# Patient Record
Sex: MALE | Race: OTHER | HISPANIC OR LATINO
[De-identification: names, ages, dates, MRNs, and addresses within clinical notes are randomized per-mention and may not be internally consistent; named-entity substitution may affect disease eponyms.]

---

## 2017-03-13 ENCOUNTER — APPOINTMENT (OUTPATIENT)
Dept: INTERNAL MEDICINE | Facility: CLINIC | Age: 37
End: 2017-03-13

## 2018-02-14 ENCOUNTER — EMERGENCY (EMERGENCY)
Facility: HOSPITAL | Age: 38
LOS: 1 days | Discharge: HOME | End: 2018-02-14
Admitting: EMERGENCY MEDICINE
Payer: COMMERCIAL

## 2018-02-14 VITALS
OXYGEN SATURATION: 100 % | HEART RATE: 71 BPM | RESPIRATION RATE: 16 BRPM | DIASTOLIC BLOOD PRESSURE: 90 MMHG | TEMPERATURE: 98 F | SYSTOLIC BLOOD PRESSURE: 152 MMHG

## 2018-02-14 PROCEDURE — 99282 EMERGENCY DEPT VISIT SF MDM: CPT | Mod: 25

## 2018-02-14 RX ORDER — ACETAMINOPHEN 500 MG
650 TABLET ORAL ONCE
Qty: 0 | Refills: 0 | Status: COMPLETED | OUTPATIENT
Start: 2018-02-14 | End: 2018-02-14

## 2018-02-14 RX ADMIN — Medication 650 MILLIGRAM(S): at 07:20

## 2018-02-14 NOTE — PROGRESS NOTE ADULT - SUBJECTIVE AND OBJECTIVE BOX
Patient is a 37y old male who presents with a chief complaint of right ear pain. Dental consulted to evaluate for possible odontogenic origin.    HPI: "37yM w/ pmhx recent dental abscess drained last week presenting with right submandibular pain. Pt states he originally felt right ear pain beginning in January, he saw his ENT last week and was told he had inflammation of the right ear, was given a course of amoxiciillin. 10 days ago pt had a dental filling places in his right upper molar, and had a dental abscess drained to the left upper molar 5 days ago. Pt states his dentist told him the pain was not from his teeth. Pt states pain starts below the right ear and radiates to the submandibular region. Denies fever/chills, headache, dizziness, change to hearing or vision, numbness/tingling, redness to area, swelling to area, abd pain, n/v/d, cp, sob or any other concerns."    Patient reports that he has had right ear-region pain since mid-January, and states that he wants a second opinion on where this pain might be coming from. Patient reports that he feels that he grinds his teeth.      PAST MEDICAL & SURGICAL HISTORY:  No pertinent past medical history  No pertinent past medical history  No significant past surgical history  No significant past surgical history    MEDICATIONS  (STANDING): None    Allergies: No Known Allergies    SOCIAL HISTORY: Patient presents to ED alone.    Last Dental Visit: 1 week ago.    Vital Signs Last 24 Hrs  T(C): 36.4 (14 Feb 2018 06:23), Max: 36.4 (14 Feb 2018 06:23)  T(F): 97.6 (14 Feb 2018 06:23), Max: 97.6 (14 Feb 2018 06:23)  HR: 71 (14 Feb 2018 06:23) (71 - 71)  BP: 152/90 (14 Feb 2018 06:23) (152/90 - 152/90)  BP(mean): --  RR: 16 (14 Feb 2018 06:23) (16 - 16)  SpO2: 100% (14 Feb 2018 06:23) (100% - 100%)    EOE:  TMJ (  - ) clicks                    ( -   ) pops                    (  -  ) crepitus             Mandible FROM             Facial bones and MOM grossly intact             (  - ) trismus             ( -  ) LAD             (  - ) swelling             ( -  ) asymmetry             (  + ) palpation: Slight pain on palpation of muscle (possibly plastysma, sternocleidfomastoid) immediately inferior to right angle of mandible. Differential diagnosis includes muscle soreness/guarding due to bruxism.             (  - ) dysphagia    IOE:   Permanent dentition, grossly intact.            hard/soft palate:  ( -  ) palatal torus, No pathology noted           tongue/FOM No pathology noted           labial/buccal mucosa No pathology noted           (  - ) percussion           (  - ) palpation           ( -  ) swelling   No gross caries detected on any teeth in UR or LL. Gingiva WNL. No vestibular fullness or erythema. Existing fillings grossly intact.     DENTAL RADIOGRAPHS: Panoramic radiograph taken. No PARs noted on upper right or lower right. Teeth in upper right and lower right grossly intact.    RADIOLOGY & ADDITIONAL STUDIES: None    ASSESSMENT: Patient presents with no signs or symptoms of acute infection at this time.     PROCEDURE:  Verbal  consent given. EOE, IOE performed, panoramic radiograph taken.    RECOMMENDATIONS:  1) OTC pain medication regimen as needed: acetaminophen or ibuprofen  2) Dental F/U with outpatient dentist for comprehensive dental care.   3) If any acute oral changes occur, including oral swelling, oral bleeding, difficulty swallowing/breathing, fever occur, return to ED.    Sherry Hgoue DDS. Pager #[phone].

## 2018-02-14 NOTE — ED PROVIDER NOTE - PROGRESS NOTE DETAILS
TAMARA Delgado: Spoke with dental who will see patient in the ED TAMARA Delgado: Spoke with dental, no sign of infection or need for antibiotics, likely muscle pain from gaurding due to pain from recent procedures TAMARA Delgado: Spoke with dental, no sign of infection or need for antibiotics, likely muscle pain from guarding due to pain from recent procedures TAMARA Delgado: Pt feeling pain relief with tylenol will d/c home with dental follow up. Pt understands to return to the emergency department with any worsening symptoms, increased pain, neck swelling, fever, throat pain or any other concerns.

## 2018-02-14 NOTE — ED PROVIDER NOTE - PHYSICAL EXAMINATION
Ear: tympanic membranes clear b/l, normal landmarks, canals are clear without erythema, non-tender pinna and auricle b/l Ear: tympanic membranes clear b/l, normal landmarks, canals are clear without erythema, non-tender pinna and auricle b/l  Neck: no swelling noted, mild tenderness to right submandibular region, no lymphadenopathy

## 2018-02-14 NOTE — ED PROVIDER NOTE - CARE PLAN
Principal Discharge DX:	Jaw pain  Assessment and plan of treatment:	Follow up with your dentist within one week  Follow up with your primary care provider within 48 hours  Take Tylenol 650mg every 6 hours as needed for pain  Return to the emergency department with any worsening or concerning symptoms, increased pain or swelling, sore throat, difficulty swallowing, fever or any other concerns.

## 2018-02-14 NOTE — ED PROVIDER NOTE - OBJECTIVE STATEMENT
37yM w/ pmhx recent dental abscess drained last week presenting with right submandibular pain. Pt states he originally felt right ear pain beginning in January, he saw his ENT last week and was told he had inflammation of the right ear, was given a course of amoxiciillin. 10 days ago pt had a dental filling places in his right upper molar, and had a dental abscess drained to the left upper molar 5 days ago. Pt states his dentist told him the pain was not from his teeth. Pt states pain starts below the right ear and radiates to the submandibular region. Denies fever/chills, headache, dizziness, change to hearing or vision, numbness/tingling, redness to area, swelling to area, abd pain, n/v/d, cp, sob or any other concerns. 37yM w/ pmhx recent dental abscess drained last week presenting with right submandibular pain. Pt states he originally felt right ear pain beginning in January, he saw his ENT last week and was told he had inflammation of the right ear, was given a course of amoxicillin. 10 days ago pt had a dental filling places in his right upper molar, and had a dental abscess drained to the left upper molar 5 days ago. Pt states his dentist told him the pain was not from his teeth. Pt states pain starts below the right ear and radiates to the submandibular region. Denies fever/chills, headache, dizziness, change to hearing or vision, numbness/tingling, redness to area, swelling to area, abd pain, n/v/d, cp, sob or any other concerns.

## 2018-02-14 NOTE — ED PROVIDER NOTE - MEDICAL DECISION MAKING DETAILS
37yM w/pmhx recent tx for right ear inflammation w/ amoxicillin, denal abscess drained 5 days ago by oral surgeon and filling placed by dentist 10 days ago presenting with right submandibular pain. Mild TTP along right submandibular region, no swelling noted, no difficulty swallowing, no sore throat, no fever/chills. Will consult dental given recent dental work to evaluate for possible dental source, pt admits to grinding his teeth as of late. Will give tylenol and reassess.

## 2018-02-14 NOTE — ED PROVIDER NOTE - CHPI ED SYMPTOMS NEG
no loss of consciousness/no nausea/no change in level of consciousness/no chills/no blurred vision/no syncope/no numbness/no weakness/no vomiting/no fever

## 2018-02-14 NOTE — ED PROVIDER NOTE - PLAN OF CARE
Follow up with your dentist within one week  Follow up with your primary care provider within 48 hours  Take Tylenol 650mg every 6 hours as needed for pain  Return to the emergency department with any worsening or concerning symptoms, increased pain or swelling, sore throat, difficulty swallowing, fever or any other concerns.

## 2018-02-14 NOTE — ED ADULT TRIAGE NOTE - CHIEF COMPLAINT QUOTE
Pt c/o right ear pain that radiates to right jaw area. Pt reports that he was dx with a right ear infection last week and completed his prescribed Amoxicillin med.

## 2018-12-30 ENCOUNTER — EMERGENCY (EMERGENCY)
Facility: HOSPITAL | Age: 38
LOS: 1 days | End: 2018-12-30
Attending: EMERGENCY MEDICINE
Payer: COMMERCIAL

## 2018-12-30 VITALS
HEIGHT: 68 IN | OXYGEN SATURATION: 98 % | RESPIRATION RATE: 18 BRPM | SYSTOLIC BLOOD PRESSURE: 140 MMHG | HEART RATE: 82 BPM | TEMPERATURE: 98 F | DIASTOLIC BLOOD PRESSURE: 78 MMHG | WEIGHT: 162.92 LBS

## 2018-12-30 VITALS
DIASTOLIC BLOOD PRESSURE: 81 MMHG | HEART RATE: 62 BPM | OXYGEN SATURATION: 98 % | RESPIRATION RATE: 18 BRPM | SYSTOLIC BLOOD PRESSURE: 132 MMHG

## 2018-12-30 PROCEDURE — 73140 X-RAY EXAM OF FINGER(S): CPT

## 2018-12-30 PROCEDURE — 99053 MED SERV 10PM-8AM 24 HR FAC: CPT

## 2018-12-30 PROCEDURE — 73140 X-RAY EXAM OF FINGER(S): CPT | Mod: 26,LT

## 2018-12-30 PROCEDURE — 99283 EMERGENCY DEPT VISIT LOW MDM: CPT | Mod: 25

## 2018-12-30 PROCEDURE — 99284 EMERGENCY DEPT VISIT MOD MDM: CPT

## 2018-12-30 PROCEDURE — 73564 X-RAY EXAM KNEE 4 OR MORE: CPT | Mod: 26,LT

## 2018-12-30 PROCEDURE — 73564 X-RAY EXAM KNEE 4 OR MORE: CPT

## 2018-12-30 RX ORDER — IBUPROFEN 200 MG
600 TABLET ORAL ONCE
Qty: 0 | Refills: 0 | Status: COMPLETED | OUTPATIENT
Start: 2018-12-30 | End: 2018-12-30

## 2018-12-30 RX ADMIN — Medication 600 MILLIGRAM(S): at 00:53

## 2018-12-30 NOTE — ED PROVIDER NOTE - NSFOLLOWUPINSTRUCTIONS_ED_ALL_ED_FT
Follow up with orthopedics. See the provided number or call [phone] to establish follow up  Take ibuprofen 600mg every 6 hours and/or acetaminophen 650mg every 4-6 hours as needed for pain  Ice the affected areas for 10-15 minutes at a time several times daily and keep the injured extremities elevated while at rest  Return to the ER for any new or worsening pain.
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
## 2018-12-30 NOTE — ED ADULT NURSE NOTE - NSIMPLEMENTINTERV_GEN_ALL_ED
Implemented All Universal Safety Interventions:  [place] to call system. Call bell, personal items and telephone within reach. Instruct patient to call for assistance. Room bathroom lighting operational. Non-slip footwear when patient is off stretcher. Physically safe environment: no spills, clutter or unnecessary equipment. Stretcher in lowest position, wheels locked, appropriate side rails in place.
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
## 2018-12-30 NOTE — ED PROVIDER NOTE - CARE PROVIDER_API CALL
Rigo Maharaj (MD), Orthopaedic Surgery  [address] [place] [address]  Phone: [phone]  Fax: [phone]
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
## 2019-01-03 ENCOUNTER — INBOUND DOCUMENT (OUTPATIENT)
Age: 39
End: 2019-01-03

## 2021-01-20 NOTE — ED PROVIDER NOTE - CPE EDP CARDIAC NORM
Dx: L TKA         Authorized # of Visits:  [id number]         Next MD visit: 12/9/20  Fall Risk: standard         Precautions: Parkinson's dx. .         Progress Summary  Pt has attended 18 visits in Physical Therapy to date.      Subjective:     Patient reports jolanta Progressing  · Pt will improve knee AROM flexion to >120 degrees to improve ability to perform sitting in low chairs.   met  · Pt will improve quad strength to 5/5 to ascend 1 flight of stairs reciprocally without UE assist.  progressing  · Pt will increase [id number] QS with folded towel under knee 5 seconds x 10. With folded towel under knee 5 seconds x 10. QS:   X 10 with towel under knee and x 10 under heel. X 10 each position. stm left knee area by PT.   X 10 with towel under knee and x 10 under he minutes. Load 4 for 6 minutes. NuStep load 5 for 6 minutes. NuStep load 5 for 6 minutes. NuStep load 5 for 6 minutes. NuStep (7/9) load 5 for 6 minutes. Load 5 for 6 minutes. NuStep load 5 for 6 minutes.         Manual:  Knee PROM, 6 min  Light distra PSD x 10 l/r. Bernard Whipple DL HR on step x 10. Wooden balance board a/p x 30.   6\" LSU x 15 L.   4\" ASU/Over x 10 L. DL HR on step x 10. Airex big marching x 15 l/r. .   -    See progress note. Charges: Compa Arriola.    Total Timed Treatment: 45 min normal...

## 2022-09-14 ENCOUNTER — EMERGENCY (EMERGENCY)
Facility: HOSPITAL | Age: 42
LOS: 1 days | Discharge: HOME | End: 2022-09-14
Attending: STUDENT IN AN ORGANIZED HEALTH CARE EDUCATION/TRAINING PROGRAM | Admitting: STUDENT IN AN ORGANIZED HEALTH CARE EDUCATION/TRAINING PROGRAM

## 2022-09-14 VITALS
DIASTOLIC BLOOD PRESSURE: 70 MMHG | OXYGEN SATURATION: 100 % | RESPIRATION RATE: 16 BRPM | TEMPERATURE: 98 F | SYSTOLIC BLOOD PRESSURE: 119 MMHG | HEART RATE: 78 BPM

## 2022-09-14 VITALS
OXYGEN SATURATION: 100 % | DIASTOLIC BLOOD PRESSURE: 92 MMHG | RESPIRATION RATE: 16 BRPM | TEMPERATURE: 98 F | HEART RATE: 87 BPM | HEIGHT: 68 IN | SYSTOLIC BLOOD PRESSURE: 164 MMHG

## 2022-09-14 LAB
ALBUMIN SERPL ELPH-MCNC: 5.1 G/DL — HIGH (ref 3.3–5)
ALP SERPL-CCNC: 83 U/L — SIGNIFICANT CHANGE UP (ref 40–120)
ALT FLD-CCNC: 53 U/L — HIGH (ref 4–41)
ANION GAP SERPL CALC-SCNC: 15 MMOL/L — HIGH (ref 7–14)
AST SERPL-CCNC: 33 U/L — SIGNIFICANT CHANGE UP (ref 4–40)
BASOPHILS # BLD AUTO: 0.03 K/UL — SIGNIFICANT CHANGE UP (ref 0–0.2)
BASOPHILS NFR BLD AUTO: 0.5 % — SIGNIFICANT CHANGE UP (ref 0–2)
BILIRUB SERPL-MCNC: 1.2 MG/DL — SIGNIFICANT CHANGE UP (ref 0.2–1.2)
BUN SERPL-MCNC: 13 MG/DL — SIGNIFICANT CHANGE UP (ref 7–23)
CALCIUM SERPL-MCNC: 9.3 MG/DL — SIGNIFICANT CHANGE UP (ref 8.4–10.5)
CHLORIDE SERPL-SCNC: 99 MMOL/L — SIGNIFICANT CHANGE UP (ref 98–107)
CO2 SERPL-SCNC: 23 MMOL/L — SIGNIFICANT CHANGE UP (ref 22–31)
CREAT SERPL-MCNC: 0.8 MG/DL — SIGNIFICANT CHANGE UP (ref 0.5–1.3)
EGFR: 114 ML/MIN/1.73M2 — SIGNIFICANT CHANGE UP
EOSINOPHIL # BLD AUTO: 0.22 K/UL — SIGNIFICANT CHANGE UP (ref 0–0.5)
EOSINOPHIL NFR BLD AUTO: 3.9 % — SIGNIFICANT CHANGE UP (ref 0–6)
GLUCOSE SERPL-MCNC: 116 MG/DL — HIGH (ref 70–99)
HCT VFR BLD CALC: 44.6 % — SIGNIFICANT CHANGE UP (ref 39–50)
HGB BLD-MCNC: 15.6 G/DL — SIGNIFICANT CHANGE UP (ref 13–17)
IANC: 3.14 K/UL — SIGNIFICANT CHANGE UP (ref 1.8–7.4)
IMM GRANULOCYTES NFR BLD AUTO: 0.4 % — SIGNIFICANT CHANGE UP (ref 0–1.5)
LYMPHOCYTES # BLD AUTO: 1.82 K/UL — SIGNIFICANT CHANGE UP (ref 1–3.3)
LYMPHOCYTES # BLD AUTO: 32.3 % — SIGNIFICANT CHANGE UP (ref 13–44)
MCHC RBC-ENTMCNC: 30.7 PG — SIGNIFICANT CHANGE UP (ref 27–34)
MCHC RBC-ENTMCNC: 35 GM/DL — SIGNIFICANT CHANGE UP (ref 32–36)
MCV RBC AUTO: 87.8 FL — SIGNIFICANT CHANGE UP (ref 80–100)
MONOCYTES # BLD AUTO: 0.4 K/UL — SIGNIFICANT CHANGE UP (ref 0–0.9)
MONOCYTES NFR BLD AUTO: 7.1 % — SIGNIFICANT CHANGE UP (ref 2–14)
NEUTROPHILS # BLD AUTO: 3.14 K/UL — SIGNIFICANT CHANGE UP (ref 1.8–7.4)
NEUTROPHILS NFR BLD AUTO: 55.8 % — SIGNIFICANT CHANGE UP (ref 43–77)
NRBC # BLD: 0 /100 WBCS — SIGNIFICANT CHANGE UP (ref 0–0)
NRBC # FLD: 0 K/UL — SIGNIFICANT CHANGE UP (ref 0–0)
NT-PROBNP SERPL-SCNC: <5 PG/ML — SIGNIFICANT CHANGE UP
PLATELET # BLD AUTO: 276 K/UL — SIGNIFICANT CHANGE UP (ref 150–400)
POTASSIUM SERPL-MCNC: 3.7 MMOL/L — SIGNIFICANT CHANGE UP (ref 3.5–5.3)
POTASSIUM SERPL-SCNC: 3.7 MMOL/L — SIGNIFICANT CHANGE UP (ref 3.5–5.3)
PROT SERPL-MCNC: 7.6 G/DL — SIGNIFICANT CHANGE UP (ref 6–8.3)
RBC # BLD: 5.08 M/UL — SIGNIFICANT CHANGE UP (ref 4.2–5.8)
RBC # FLD: 13.2 % — SIGNIFICANT CHANGE UP (ref 10.3–14.5)
SODIUM SERPL-SCNC: 137 MMOL/L — SIGNIFICANT CHANGE UP (ref 135–145)
TROPONIN T, HIGH SENSITIVITY RESULT: <6 NG/L — SIGNIFICANT CHANGE UP
TROPONIN T, HIGH SENSITIVITY RESULT: <6 NG/L — SIGNIFICANT CHANGE UP
WBC # BLD: 5.63 K/UL — SIGNIFICANT CHANGE UP (ref 3.8–10.5)
WBC # FLD AUTO: 5.63 K/UL — SIGNIFICANT CHANGE UP (ref 3.8–10.5)

## 2022-09-14 PROCEDURE — 93010 ELECTROCARDIOGRAM REPORT: CPT

## 2022-09-14 PROCEDURE — 71046 X-RAY EXAM CHEST 2 VIEWS: CPT | Mod: 26

## 2022-09-14 PROCEDURE — 99285 EMERGENCY DEPT VISIT HI MDM: CPT | Mod: 25

## 2022-09-14 RX ORDER — SODIUM CHLORIDE 9 MG/ML
1000 INJECTION, SOLUTION INTRAVENOUS ONCE
Refills: 0 | Status: COMPLETED | OUTPATIENT
Start: 2022-09-14 | End: 2022-09-14

## 2022-09-14 RX ORDER — ACETAMINOPHEN 500 MG
650 TABLET ORAL ONCE
Refills: 0 | Status: COMPLETED | OUTPATIENT
Start: 2022-09-14 | End: 2022-09-14

## 2022-09-14 RX ORDER — DIPHENHYDRAMINE HCL 50 MG
25 CAPSULE ORAL ONCE
Refills: 0 | Status: COMPLETED | OUTPATIENT
Start: 2022-09-14 | End: 2022-09-14

## 2022-09-14 RX ORDER — METOCLOPRAMIDE HCL 10 MG
10 TABLET ORAL ONCE
Refills: 0 | Status: COMPLETED | OUTPATIENT
Start: 2022-09-14 | End: 2022-09-14

## 2022-09-14 RX ADMIN — SODIUM CHLORIDE 1000 MILLILITER(S): 9 INJECTION, SOLUTION INTRAVENOUS at 11:57

## 2022-09-14 RX ADMIN — Medication 650 MILLIGRAM(S): at 11:53

## 2022-09-14 RX ADMIN — Medication 10 MILLIGRAM(S): at 11:52

## 2022-09-14 RX ADMIN — Medication 25 MILLIGRAM(S): at 11:52

## 2022-09-14 NOTE — ED ADULT NURSE NOTE - OBJECTIVE STATEMENT
Pt presents to ED ambulatory with steady gait c/o cp. 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 States when the sensation occurred he began to feel SOB and hyperventilate and felt tingling in both hands. When the pain resolved a few minutes later all his other symptoms did as well. Currently denies any pain, but expresses anxiety. States that for a month he has been having HAs and right jaw pain but is unsure if the two are related. Denies any PMH. NSR on cardiac monitor. 20G Consuelo placed in RAC. Flushed well with 10cc NS with good blood return. No s/s discomfort or erythema at insertion site. call bell within reach. Education provided to pt on how to and when to use call bell for assistance. Will continue to monitor. GAETANO Walker

## 2022-09-14 NOTE — ED PROVIDER NOTE - PHYSICAL EXAMINATION
Gen: Awake, Alert, WD, WN, NAD  Head:  NC/AT  Eyes:  PERRL, EOMI, Conjunctiva pink, lids normal, no scleral icterus  ENT: OP clear, no exudates,, moist mucus membranes  Neck: supple, nontender, no meningismus, no JVD, trachea midline  Cardiac/CV:  S1 S2, RRR, no M/G/R  Respiratory/Pulm:  CTAB, good air movement, normal resp effort, no wheezes/stridor/retractions/rales/rhonchi  Gastrointestinal/Abdomen:  Soft, nontender, nondistended, +BS, no rebound/guarding  Back:  no CVAT, no MLT  Ext:  warm, well perfused, moving all extremities spontaneously, no peripheral edema, distal pulses intact  Skin: intact, no rash  Neuro:  AAOx3, sensation intact, motor 5/5 x 4 extremities, normal gait, speech clear

## 2022-09-14 NOTE — ED ADULT TRIAGE NOTE - CHIEF COMPLAINT QUOTE
Pt arrives ambulatory to triage c/o left-sided CP and bilat UE tingling since this AM, states "I was standing at work, stretched, and the pain started." Also endorses mild SOB. RR even and unlabored. Denies PMH.

## 2022-09-14 NOTE — ED PROVIDER NOTE - OBJECTIVE STATEMENT
43 y/o m with no PMH p/w left sided chest pain. pt states he was at work and was stretching his hands up and had sudden onset pain in the left side of his chest. pt reports pain was sharp pressure like located on the left side of his chest and lasting for about 3 minutes. he states he felt some anxiety after the symptoms began. He denies dizziness, weakness, sob, denies current chest pain. pt reports having headache on and off for few months and it got worse today after coming to hospital.

## 2022-09-14 NOTE — ED ADULT TRIAGE NOTE - TEMPERATURE IN FAHRENHEIT (DEGREES F)
98.4 Xolair Pregnancy And Lactation Text: This medication is Pregnancy Category B and is considered safe during pregnancy. This medication is excreted in breast milk.

## 2022-09-14 NOTE — ED PROVIDER NOTE - PROGRESS NOTE DETAILS
Roman Eisenberg, PGY4: 42 year old male no PMH presents for L sided chest pain that started when he was stretching at work around 10am. Acute onset of sharp pain, no associated n/v, sob, or diaphoresis. Pain now completely resolved. No FHx, no tobacco, no recreational drug use. Patient well appearing, no chest wall tenderness, pain non-reproducible. Lungs clear, heart rrr, no pedal edema. Likely MSK strain but will eval for ACS w/ ekg and trop, cxr r/o ptx, pain meds, likely d/c if workup negative. Roman Eisenberg, PGY4: Pain resolved presently. CXR clear, trop <6 x2. Patient has PMD to follow up with. Discussed return precautions and all questions answered. Pt in agreement w/ plan. CAOx3, NAD, VSS. Stable for d/c.

## 2022-09-14 NOTE — ED PROVIDER NOTE - PATIENT PORTAL LINK FT
You can access the FollowMyHealth Patient Portal offered by [facility] by registering at the following website: [email or web address]. By joining [employer]’s FollowMyHealth portal, you will also be able to view your health information using other applications (apps) compatible with our system.

## 2022-09-14 NOTE — ED PROVIDER NOTE - CLINICAL SUMMARY MEDICAL DECISION MAKING FREE TEXT BOX
43 y/o m with no PMH p/w left sided chest pain. pt states he was at work and was stretching his hands up and had sudden onset pain in the left side of his chest. pt reports pain was sharp pressure like located on the left side of his chest and lasting for about 3 minutes. pt well appearing ekg wnl, no dizziness, ctab.  low supsicion of ACS, likely msk, perc negative, will obtain cxr, cbc, cmp, trop, heart score of 0 . pt also w/ mild headache tension type will given migraine cocktail, reassess, pt provided with neuro f/u  for headaches, normal neuro exam

## 2023-05-08 NOTE — ED PROVIDER NOTE - CPE EDP ENMT NORM

## 2024-10-06 ENCOUNTER — OUTPATIENT (OUTPATIENT)
Dept: EMERGENCY DEPT | Facility: HOSPITAL | Age: 44
LOS: 1 days | Discharge: HOME | End: 2024-10-06

## 2024-10-06 VITALS
RESPIRATION RATE: 18 BRPM | OXYGEN SATURATION: 98 % | SYSTOLIC BLOOD PRESSURE: 140 MMHG | DIASTOLIC BLOOD PRESSURE: 85 MMHG | TEMPERATURE: 98 F | HEART RATE: 79 BPM

## 2024-10-06 VITALS
SYSTOLIC BLOOD PRESSURE: 159 MMHG | DIASTOLIC BLOOD PRESSURE: 97 MMHG | OXYGEN SATURATION: 100 % | RESPIRATION RATE: 18 BRPM | HEART RATE: 87 BPM | HEIGHT: 68 IN | WEIGHT: 179.02 LBS | TEMPERATURE: 98 F

## 2024-10-06 DIAGNOSIS — R09.A2 FOREIGN BODY SENSATION, THROAT: ICD-10-CM

## 2024-10-06 LAB
ALBUMIN SERPL ELPH-MCNC: 4.9 G/DL — SIGNIFICANT CHANGE UP (ref 3.3–5)
ALP SERPL-CCNC: 104 U/L — SIGNIFICANT CHANGE UP (ref 40–120)
ALT FLD-CCNC: 48 U/L — HIGH (ref 4–41)
ANION GAP SERPL CALC-SCNC: 14 MMOL/L — SIGNIFICANT CHANGE UP (ref 7–14)
APTT BLD: 32.9 SEC — SIGNIFICANT CHANGE UP (ref 24.5–35.6)
AST SERPL-CCNC: 29 U/L — SIGNIFICANT CHANGE UP (ref 4–40)
BASOPHILS # BLD AUTO: 0.03 K/UL — SIGNIFICANT CHANGE UP (ref 0–0.2)
BASOPHILS NFR BLD AUTO: 0.4 % — SIGNIFICANT CHANGE UP (ref 0–2)
BILIRUB SERPL-MCNC: 1 MG/DL — SIGNIFICANT CHANGE UP (ref 0.2–1.2)
BLD GP AB SCN SERPL QL: NEGATIVE — SIGNIFICANT CHANGE UP
BUN SERPL-MCNC: 17 MG/DL — SIGNIFICANT CHANGE UP (ref 7–23)
CALCIUM SERPL-MCNC: 9.6 MG/DL — SIGNIFICANT CHANGE UP (ref 8.4–10.5)
CHLORIDE SERPL-SCNC: 103 MMOL/L — SIGNIFICANT CHANGE UP (ref 98–107)
CO2 SERPL-SCNC: 25 MMOL/L — SIGNIFICANT CHANGE UP (ref 22–31)
CREAT SERPL-MCNC: 0.85 MG/DL — SIGNIFICANT CHANGE UP (ref 0.5–1.3)
EGFR: 111 ML/MIN/1.73M2 — SIGNIFICANT CHANGE UP
EOSINOPHIL # BLD AUTO: 0.07 K/UL — SIGNIFICANT CHANGE UP (ref 0–0.5)
EOSINOPHIL NFR BLD AUTO: 1 % — SIGNIFICANT CHANGE UP (ref 0–6)
GLUCOSE SERPL-MCNC: 109 MG/DL — HIGH (ref 70–99)
HCT VFR BLD CALC: 45.2 % — SIGNIFICANT CHANGE UP (ref 39–50)
HGB BLD-MCNC: 15.5 G/DL — SIGNIFICANT CHANGE UP (ref 13–17)
IANC: 5.18 K/UL — SIGNIFICANT CHANGE UP (ref 1.8–7.4)
IMM GRANULOCYTES NFR BLD AUTO: 0.1 % — SIGNIFICANT CHANGE UP (ref 0–0.9)
INR BLD: 1.01 RATIO — SIGNIFICANT CHANGE UP (ref 0.85–1.16)
LYMPHOCYTES # BLD AUTO: 1.35 K/UL — SIGNIFICANT CHANGE UP (ref 1–3.3)
LYMPHOCYTES # BLD AUTO: 19 % — SIGNIFICANT CHANGE UP (ref 13–44)
MAGNESIUM SERPL-MCNC: 2.2 MG/DL — SIGNIFICANT CHANGE UP (ref 1.6–2.6)
MCHC RBC-ENTMCNC: 30.5 PG — SIGNIFICANT CHANGE UP (ref 27–34)
MCHC RBC-ENTMCNC: 34.3 GM/DL — SIGNIFICANT CHANGE UP (ref 32–36)
MCV RBC AUTO: 89 FL — SIGNIFICANT CHANGE UP (ref 80–100)
MONOCYTES # BLD AUTO: 0.48 K/UL — SIGNIFICANT CHANGE UP (ref 0–0.9)
MONOCYTES NFR BLD AUTO: 6.7 % — SIGNIFICANT CHANGE UP (ref 2–14)
NEUTROPHILS # BLD AUTO: 5.18 K/UL — SIGNIFICANT CHANGE UP (ref 1.8–7.4)
NEUTROPHILS NFR BLD AUTO: 72.8 % — SIGNIFICANT CHANGE UP (ref 43–77)
NRBC # BLD: 0 /100 WBCS — SIGNIFICANT CHANGE UP (ref 0–0)
NRBC # FLD: 0 K/UL — SIGNIFICANT CHANGE UP (ref 0–0)
PHOSPHATE SERPL-MCNC: 4.5 MG/DL — SIGNIFICANT CHANGE UP (ref 2.5–4.5)
PLATELET # BLD AUTO: 306 K/UL — SIGNIFICANT CHANGE UP (ref 150–400)
POTASSIUM SERPL-MCNC: 4.1 MMOL/L — SIGNIFICANT CHANGE UP (ref 3.5–5.3)
POTASSIUM SERPL-SCNC: 4.1 MMOL/L — SIGNIFICANT CHANGE UP (ref 3.5–5.3)
PROT SERPL-MCNC: 7.5 G/DL — SIGNIFICANT CHANGE UP (ref 6–8.3)
PROTHROM AB SERPL-ACNC: 11.7 SEC — SIGNIFICANT CHANGE UP (ref 9.9–13.4)
RBC # BLD: 5.08 M/UL — SIGNIFICANT CHANGE UP (ref 4.2–5.8)
RBC # FLD: 13 % — SIGNIFICANT CHANGE UP (ref 10.3–14.5)
RH IG SCN BLD-IMP: POSITIVE — SIGNIFICANT CHANGE UP
SODIUM SERPL-SCNC: 142 MMOL/L — SIGNIFICANT CHANGE UP (ref 135–145)
WBC # BLD: 7.12 K/UL — SIGNIFICANT CHANGE UP (ref 3.8–10.5)
WBC # FLD AUTO: 7.12 K/UL — SIGNIFICANT CHANGE UP (ref 3.8–10.5)

## 2024-10-06 DEVICE — IMPLANTABLE DEVICE: Type: IMPLANTABLE DEVICE | Status: FUNCTIONAL

## 2024-10-06 RX ORDER — ONDANSETRON HCL/PF 4 MG/2 ML
4 VIAL (ML) INJECTION ONCE
Refills: 0 | Status: COMPLETED | OUTPATIENT
Start: 2024-10-06 | End: 2024-10-06

## 2024-10-06 RX ORDER — HYDROMORPHONE HYDROCHLORIDE 1 MG/ML
0.5 INJECTION, SOLUTION INTRAMUSCULAR; INTRAVENOUS; SUBCUTANEOUS
Refills: 0 | Status: DISCONTINUED | OUTPATIENT
Start: 2024-10-06 | End: 2024-10-06

## 2024-10-06 RX ORDER — GLUCAGON INJECTION, SOLUTION 0.5 MG/.1ML
1 INJECTION, SOLUTION SUBCUTANEOUS ONCE
Refills: 0 | Status: COMPLETED | OUTPATIENT
Start: 2024-10-06 | End: 2024-10-06

## 2024-10-06 RX ORDER — FAMOTIDINE 40 MG
20 TABLET ORAL
Refills: 0 | Status: DISCONTINUED | OUTPATIENT
Start: 2024-10-06 | End: 2024-10-06

## 2024-10-06 RX ORDER — ONDANSETRON HCL/PF 4 MG/2 ML
4 VIAL (ML) INJECTION EVERY 8 HOURS
Refills: 0 | Status: DISCONTINUED | OUTPATIENT
Start: 2024-10-06 | End: 2024-10-06

## 2024-10-06 RX ORDER — FAMOTIDINE 40 MG
20 TABLET ORAL ONCE
Refills: 0 | Status: COMPLETED | OUTPATIENT
Start: 2024-10-06 | End: 2024-10-06

## 2024-10-06 RX ORDER — PANTOPRAZOLE SODIUM 40 MG/1
1 TABLET, DELAYED RELEASE ORAL
Qty: 60 | Refills: 0
Start: 2024-10-06 | End: 2024-11-04

## 2024-10-06 RX ORDER — SODIUM CHLORIDE IRRIG SOLUTION 0.9 %
500 SOLUTION, IRRIGATION IRRIGATION
Refills: 0 | Status: DISCONTINUED | OUTPATIENT
Start: 2024-10-06 | End: 2024-10-06

## 2024-10-06 RX ORDER — MAG HYDROX/ALUMINUM HYD/SIMETH 200-200-20
30 SUSPENSION, ORAL (FINAL DOSE FORM) ORAL ONCE
Refills: 0 | Status: DISCONTINUED | OUTPATIENT
Start: 2024-10-06 | End: 2024-10-06

## 2024-10-06 RX ADMIN — Medication 20 MILLIGRAM(S): at 03:51

## 2024-10-06 RX ADMIN — Medication 4 MILLIGRAM(S): at 03:52

## 2024-10-06 RX ADMIN — GLUCAGON INJECTION, SOLUTION 1 MILLIGRAM(S): 0.5 INJECTION, SOLUTION SUBCUTANEOUS at 03:52

## 2024-10-06 NOTE — DISCHARGE NOTE PROVIDER - HOSPITAL COURSE
HPI:  45 yo male with GERD presents with complaints of globus sensation after eating a steak. symptom started around 7pm when he was eating a piece of boneless steak which got stuck in the throat, started having cough and burning sensation on the throat. wasn't able to swallow after. Hasn't eaten anything since. Denies having shortness of breath. no fever/chills. no drooling.  Denies previous hx of dysphagia or aspiration events.  reports GERD for which takes tums as needed. doesn't take any other medications.     In the ed, afebrile, vss. satting well on room air. CT Neck with No dominant cervical mass or significant lymphadenopathy.No gross evidence of radiopaque foreign body on this contrast-enhanced   study. No deep soft tissue emphysema is seen.   (06 Oct 2024 11:09)      HOSPITAL COURSE:  Vital Signs Last 24 Hrs  T(C): 36.9 (06 Oct 2024 15:48), Max: 37.2 (06 Oct 2024 09:50)  T(F): 98.4 (06 Oct 2024 15:48), Max: 98.9 (06 Oct 2024 09:50)  HR: 70 (06 Oct 2024 15:48) (66 - 87)  BP: 139/89 (06 Oct 2024 15:48) (130/94 - 159/97)  BP(mean): --  RR: 18 (06 Oct 2024 15:48) (14 - 18)  SpO2: 100% (06 Oct 2024 15:48) (100% - 100%)    Parameters below as of 06 Oct 2024 09:50  Patient On (Oxygen Delivery Method): room air          Patient currently denies chest pain, sob, palpitations, dizziness or lightheadedness. Case d/w attending (Dr. Murphy): Pt to be dc'd home and F/U with his PCP and Cardiologist as instructed.>>> <<< HPI:  45 yo male with GERD presents with complaints of globus sensation after eating a steak. symptom started around 7pm when he was eating a piece of boneless steak which got stuck in the throat, started having cough and burning sensation on the throat. wasn't able to swallow after. Hasn't eaten anything since. Denies having shortness of breath. no fever/chills. no drooling.  Denies previous hx of dysphagia or aspiration events.  reports GERD for which takes tums as needed. doesn't take any other medications.     In the ed, afebrile, vss. satting well on room air. CT Neck with No dominant cervical mass or significant lymphadenopathy.No gross evidence of radiopaque foreign body on this contrast-enhanced   study. No deep soft tissue emphysema is seen.   (06 Oct 2024 11:09)      HOSPITAL COURSE:  Vital Signs Last 24 Hrs  T(C): 36.9 (06 Oct 2024 15:48), Max: 37.2 (06 Oct 2024 09:50)  T(F): 98.4 (06 Oct 2024 15:48), Max: 98.9 (06 Oct 2024 09:50)  HR: 70 (06 Oct 2024 15:48) (66 - 87)  BP: 139/89 (06 Oct 2024 15:48) (130/94 - 159/97)  BP(mean): --  RR: 18 (06 Oct 2024 15:48) (14 - 18)  SpO2: 100% (06 Oct 2024 15:48) (100% - 100%)    Parameters below as of 06 Oct 2024 09:50  Patient On (Oxygen Delivery Method): room air      EGD performed by GI,  found to have Esophageal food bolus impaction (steak): removal with a Ortiz net. Biopsy taken. Recommend protonix 40mg bid. GI f/u.   Patient currently denies chest pain, sob, palpitations, dizziness or lightheadedness. Case d/w attending (Dr. Murphy): Pt to be dc'd home and F/U with his PCP and Cardiologist as instructed.>>> <<<

## 2024-10-06 NOTE — ED PROVIDER NOTE - DISPOSITION TYPE
Juan Manuel Bass  72 y.o. male  Chief Complaint   Patient presents with   • Shortness of Breath     Pt came to the ER from home c/o SOB. Per EMS, pt was just recently discharged from [facility] with a home O2 at 3L NC continuously. Pt currently at 4L NC satting at 96%. EMS also stated that pt is unable to care for himself at home. Pt lives in a camper/ trailer alone. Pt denies any pain at this time. Pt AOX4.   • Fall       Pt BIB EMS for above complaint.    Pt is alert and oriented, speaking in full sentences, follows commands and responds appropriately to questions.    Pt educated on triage process. Pt encouraged to alert staff for any changes. This RN masked and in appropriate PPE during encounter.     Vitals:    03/12/22 2124   BP: 138/99   Pulse: (!) 115   Resp: (!) 23   Temp: 36.3 °C (97.3 °F)   SpO2: 96%       
ADMIT

## 2024-10-06 NOTE — PATIENT PROFILE ADULT - HOME ACCESSIBILITY CONCERNS
Sepsis Evaluation     I was called to see Jennifer Venegas due to  lactic acidosis . She is  not known  to have an infection.     PHYSICAL EXAM  Vital Signs:  Temp: 97.9  F (36.6  C) Temp src: Oral BP: (!) 172/116 Pulse: 89   Resp: 20 SpO2: 96 % O2 Device: None (Room air)      General: in no acute distress  Mental Status: AAOx4.     Remainder of physical exam is significant for pallor, otherwise patient feeling well.    DATA  Lactic Acid   Date Value Ref Range Status   06/26/2024 3.4 (H) 0.7 - 2.0 mmol/L Final   06/25/2024 2.3 (H) 0.7 - 2.0 mmol/L Final       ASSESSMENT AND PLAN  NO EVIDENCE OF SEPSIS at this time.  Vital sign, physical exam, and lab findings are due to hypovolemia. Cannot rule out infection, however and WBC are now elevated 13.9 but procalcitonin normal yesterday 0.06.    LR 500ml now, recheck lactic acid in 3 hours.  Control BP with 20mg IV labetalol now.    Disposition: The patient will remain on the current unit. We will continue to monitor this patient closely..  NAREN Paul CNP  06/26/24, 4:13 AM    Sepsis Criteria   Sepsis: The body's generalized inflammatory state as a response to an infection. Sepsis Predictive Model includes >80 variable to alert to potential sepsis.  Severe Sepsis: Sepsis plus one or more variables of acute organ dysfunction (Note: lactic acid >2 or acute encephalopathy each qualify as organ dysfunction)  Septic Shock: Sepsis AND hypotension despite adequate volume resuscitation with crystalloid or lactic acid >=4  Note: HYPOTENSION is defined as 2 BP readings measured 3 hrs apart that have a SBP <90, MAP <65, or decrease >40 mmHg, occurring 6 hrs before or after t-zero     Has supportive interpersonal relationships with family, friends or peers/Has access to housing/residential stability none

## 2024-10-06 NOTE — DISCHARGE NOTE NURSING/CASE MANAGEMENT/SOCIAL WORK - PATIENT PORTAL LINK FT
You can access the FollowMyHealth Patient Portal offered by [facility] by registering at the following website: [email or web address]. By joining [employer]’s FollowMyHealth portal, you will also be able to view your health information using other applications (apps) compatible with our system.

## 2024-10-06 NOTE — CONSULT NOTE ADULT - SUBJECTIVE AND OBJECTIVE BOX
Chief Complaint:  Patient is a 44y old  Male who presents with a chief complaint of     HPI:  44-year-old male with no significant past medical history presenting today for concern of foreign body.  Patient was eating boneless steak around 7 PM when he fell like a piece of a his throat.  Patient has been trying to cough/vomit out to meat.  Patient has had multiple episodes of vomiting since that is nonbloody. Pt has not ate anything since this occurred. Patient endorses increased oral secretions. Patient also endorsing burning chest pain, patient states in the past he was from acid reflux.  Patient denies difficulty breathing, abdominal pain, pooling secretions. neck pain. Never had an episode like this before. Typically no difficulty swallowing.   In the ED, unable to swallow liquids. Vitals and labs WNL.     Allergies:  No Known Allergies      Home Medications:    Hospital Medications:      PMHX/PSHX:  No pertinent past medical history    No pertinent past medical history    No pertinent past medical history    No significant past surgical history    No significant past surgical history    No significant past surgical history        Family history:  No pertinent family history in first degree relatives        Denies family history of colon cancer/polyps, stomach cancer/polyps, pancreatic cancer/masses, liver cancer/disease, ovarian cancer and endometrial cancer.    Social History:   Tob: Denies  EtOH: Denies  Illicit Drugs: Denies    ROS:     General:  No wt loss, fevers, chills, night sweats, fatigue  Eyes:  Good vision, no reported pain  ENT:  No sore throat, pain, runny nose, dysphagia  CV:  No pain, palpitations, hypo/hypertension  Pulm:  No dyspnea, cough, tachypnea, wheezing  GI:  see HPI  :  No pain, bleeding, incontinence, nocturia  Muscle:  No pain, weakness  Neuro:  No weakness, tingling, memory problems  Psych:  No fatigue, insomnia, mood problems, depression  Endocrine:  No polyuria, polydipsia, cold/heat intolerance  Heme:  No petechiae, ecchymosis, easy bruisability  Skin:  No rash, tattoos, scars, edema    PHYSICAL EXAM:     GENERAL:  No acute distress  HEENT:  NCAT, no scleral icterus   CHEST:  no respiratory distress  HEART:  Regular rate and rhythm  ABDOMEN:  Soft, non-tender, non-distended, normoactive bowel sounds,  no masses, no hepato-splenomegaly, no signs of chronic liver disease  EXTREMITIES: No edema  SKIN:  No rash/erythema/ecchymoses/petechiae/wounds/abscess/warm/dry  NEURO:  Alert and oriented x 3, no asterixis    Vital Signs:  Vital Signs Last 24 Hrs  T(C): 36.6 (06 Oct 2024 06:31), Max: 36.7 (06 Oct 2024 01:59)  T(F): 97.8 (06 Oct 2024 06:31), Max: 98.1 (06 Oct 2024 01:59)  HR: 66 (06 Oct 2024 06:31) (66 - 87)  BP: 148/80 (06 Oct 2024 06:31) (148/80 - 159/97)  BP(mean): --  RR: 16 (06 Oct 2024 06:31) (16 - 18)  SpO2: 100% (06 Oct 2024 06:31) (100% - 100%)    Parameters below as of 06 Oct 2024 06:31  Patient On (Oxygen Delivery Method): room air      Daily Height in cm: 172.72 (06 Oct 2024 01:59)    Daily     LABS:                        15.5   7.12  )-----------( 306      ( 06 Oct 2024 03:45 )             45.2     Mean Cell Volume: 89.0 fL (10-06-24 @ 03:45)    10-06    142  |  103  |  17  ----------------------------<  109[H]  4.1   |  25  |  0.85    Ca    9.6      06 Oct 2024 03:45  Phos  4.5     10-06  Mg     2.20     10-06    TPro  7.5  /  Alb  4.9  /  TBili  1.0  /  DBili  x   /  AST  29  /  ALT  48[H]  /  AlkPhos  104  10-06    LIVER FUNCTIONS - ( 06 Oct 2024 03:45 )  Alb: 4.9 g/dL / Pro: 7.5 g/dL / ALK PHOS: 104 U/L / ALT: 48 U/L / AST: 29 U/L / GGT: x           PT/INR - ( 06 Oct 2024 03:45 )   PT: 11.7 sec;   INR: 1.01 ratio         PTT - ( 06 Oct 2024 03:45 )  PTT:32.9 sec  Urinalysis Basic - ( 06 Oct 2024 03:45 )    Color: x / Appearance: x / SG: x / pH: x  Gluc: 109 mg/dL / Ketone: x  / Bili: x / Urobili: x   Blood: x / Protein: x / Nitrite: x   Leuk Esterase: x / RBC: x / WBC x   Sq Epi: x / Non Sq Epi: x / Bacteria: x                              15.5   7.12  )-----------( 306      ( 06 Oct 2024 03:45 )             45.2       Imaging:           Chief Complaint:  Patient is a 44y old  Male who presents with a chief complaint of     HPI:  44-year-old male with no significant past medical history presenting today for concern of foreign body.  Patient was eating boneless steak around 7 PM when he fell like a piece of a his throat.  Patient has been trying to cough/vomit out to meat.  Patient has had multiple episodes of vomiting since that is nonbloody. Pt has not ate anything since this occurred. Patient endorses increased oral secretions. Patient also endorsing burning chest pain, patient states in the past he was from acid reflux.  Patient denies difficulty breathing, abdominal pain, pooling secretions, neck pain. Never had an episode like this before. Typically no difficulty swallowing.   In the ED, unable to swallow liquids. Vitals and labs WNL.     Allergies:  No Known Allergies      Home Medications:    Hospital Medications:      PMHX/PSHX:  No pertinent past medical history    No pertinent past medical history    No pertinent past medical history    No significant past surgical history    No significant past surgical history    No significant past surgical history        Family history:  No pertinent family history in first degree relatives        Denies family history of colon cancer/polyps, stomach cancer/polyps, pancreatic cancer/masses, liver cancer/disease, ovarian cancer and endometrial cancer.    Social History:   Tob: Denies  EtOH: Denies  Illicit Drugs: Denies    ROS:     General:  No wt loss, fevers, chills, night sweats, fatigue  Eyes:  Good vision, no reported pain  ENT:  No sore throat, pain, runny nose, dysphagia  CV:  No pain, palpitations, hypo/hypertension  Pulm:  No dyspnea, cough, tachypnea, wheezing  GI:  see HPI  :  No pain, bleeding, incontinence, nocturia  Muscle:  No pain, weakness  Neuro:  No weakness, tingling, memory problems  Psych:  No fatigue, insomnia, mood problems, depression  Endocrine:  No polyuria, polydipsia, cold/heat intolerance  Heme:  No petechiae, ecchymosis, easy bruisability  Skin:  No rash, tattoos, scars, edema    PHYSICAL EXAM:     GENERAL:  No acute distress  HEENT:  NCAT, no scleral icterus   CHEST:  no respiratory distress  HEART:  Regular rate and rhythm  ABDOMEN:  Soft, non-tender, non-distended, normoactive bowel sounds,  no masses, no hepato-splenomegaly, no signs of chronic liver disease  EXTREMITIES: No edema  SKIN:  No rash/erythema/ecchymoses/petechiae/wounds/abscess/warm/dry  NEURO:  Alert and oriented x 3, no asterixis    Vital Signs:  Vital Signs Last 24 Hrs  T(C): 36.6 (06 Oct 2024 06:31), Max: 36.7 (06 Oct 2024 01:59)  T(F): 97.8 (06 Oct 2024 06:31), Max: 98.1 (06 Oct 2024 01:59)  HR: 66 (06 Oct 2024 06:31) (66 - 87)  BP: 148/80 (06 Oct 2024 06:31) (148/80 - 159/97)  BP(mean): --  RR: 16 (06 Oct 2024 06:31) (16 - 18)  SpO2: 100% (06 Oct 2024 06:31) (100% - 100%)    Parameters below as of 06 Oct 2024 06:31  Patient On (Oxygen Delivery Method): room air      Daily Height in cm: 172.72 (06 Oct 2024 01:59)    Daily     LABS:                        15.5   7.12  )-----------( 306      ( 06 Oct 2024 03:45 )             45.2     Mean Cell Volume: 89.0 fL (10-06-24 @ 03:45)    10-06    142  |  103  |  17  ----------------------------<  109[H]  4.1   |  25  |  0.85    Ca    9.6      06 Oct 2024 03:45  Phos  4.5     10-06  Mg     2.20     10-06    TPro  7.5  /  Alb  4.9  /  TBili  1.0  /  DBili  x   /  AST  29  /  ALT  48[H]  /  AlkPhos  104  10-06    LIVER FUNCTIONS - ( 06 Oct 2024 03:45 )  Alb: 4.9 g/dL / Pro: 7.5 g/dL / ALK PHOS: 104 U/L / ALT: 48 U/L / AST: 29 U/L / GGT: x           PT/INR - ( 06 Oct 2024 03:45 )   PT: 11.7 sec;   INR: 1.01 ratio         PTT - ( 06 Oct 2024 03:45 )  PTT:32.9 sec  Urinalysis Basic - ( 06 Oct 2024 03:45 )    Color: x / Appearance: x / SG: x / pH: x  Gluc: 109 mg/dL / Ketone: x  / Bili: x / Urobili: x   Blood: x / Protein: x / Nitrite: x   Leuk Esterase: x / RBC: x / WBC x   Sq Epi: x / Non Sq Epi: x / Bacteria: x                              15.5   7.12  )-----------( 306      ( 06 Oct 2024 03:45 )             45.2       Imaging:

## 2024-10-06 NOTE — H&P ADULT - PROBLEM SELECTOR PLAN 1
cf foreign body impaction. CT neck showed no No dominant cervical mass or significant lymphadenopathy.  No gross evidence of radiopaque foreign body on this contrast-enhanced study. No deep soft tissue emphysema is seen.    - npo for now  - GI consulted, plan for EGD today  - preop labs reviewed  - monitor vitals cf foreign body impaction. CT neck showed no No dominant cervical mass or significant lymphadenopathy.  No gross evidence of radiopaque foreign body on this contrast-enhanced study. No deep soft tissue emphysema is seen.    - npo for now  - GI consulted, plan for EGD today  - pepcid IV bid for now, zofran prn  - preop labs reviewed  - monitor vitals

## 2024-10-06 NOTE — ED PROVIDER NOTE - CLINICAL SUMMARY MEDICAL DECISION MAKING FREE TEXT BOX
44-year-old male with no significant past medical history presenting today for concern of foreign body.  Patient was eating boneless steak around 7 PM when he fell like a piece of a his throat. Pt endorsing globus sensation, multiple episodes of vomiting, not tolerating PO intake. Concern for foreign body impaction. Will give glucagon, zofran, pepcid. Will obtain CT neck. 44-year-old male with no significant past medical history presenting today for concern of foreign body.  Patient was eating boneless steak around 7 PM when he fell like a piece of a his throat. Pt endorsing globus sensation, multiple episodes of vomiting, not tolerating PO intake. Concern for foreign body impaction. Will give glucagon, zofran, pepcid. Will obtain CT neck. Dispo pending imaging and labs.

## 2024-10-06 NOTE — H&P ADULT - HISTORY OF PRESENT ILLNESS
43 yo male with GERD presents with complaints of globus sensation after eating a steak. symptom started around 7pm when he was eating a piece of boneless steak which got stuck in the throat, started having cough and burning sensation on the throat. wasn't able to swallow after. Hasn't eaten anything since. Denies having shortness of breath. no fever/chills. no drooling.  Denies previous hx of dysphagia or aspiration events.  reports GERD for which takes tums as needed. doesn't take any other medications.     In the ed, afebrile, vss. satting well on room air. CT Neck with No dominant cervical mass or significant lymphadenopathy.No gross evidence of radiopaque foreign body on this contrast-enhanced   study. No deep soft tissue emphysema is seen.

## 2024-10-06 NOTE — ED ADULT TRIAGE NOTE - BSA (M2)
Detail Level: Detailed
Quality 226: Preventive Care And Screening: Tobacco Use: Screening And Cessation Intervention: Patient screened for tobacco and never smoked
Quality 130: Documentation Of Current Medications In The Medical Record: Current Medications Documented
Quality 431: Preventive Care And Screening: Unhealthy Alcohol Use - Screening: Patient screened for unhealthy alcohol use using a single question and scores less than 2 times per year
1.95

## 2024-10-06 NOTE — CONSULT NOTE ADULT - ATTENDING COMMENTS
History/PE as above.  Patient seen/examined.  Admitted with esophageal food bolus impaction.  Last evening was eating steak and felt sense of retrosternal bolus hang up.  Not able to tolerate any oral intake since.  Although no prior similar episodes he does indicate that for past several years a few times per month he will have transient sense of bolus hang up and slow bolus transit after solid food.  No recent weight loss.  Experiences intermittent heartburn.  No other GI symptoms.  Denies history asthma or eczema.  PE/labs as noted.  See full EGD report–esophageal food bolus noted in distal esophagus which was removed with Ortiz net.  Mildly dilated esophageal lumen noted in mid and distal esophagus.  Appearance of linear furroes was noted but no appearance of typical rings or exudate.  GE junction eroded and edematous with a linear tear at GEJ with scant oozing.  Esophageal biopsies submitted for assessment of GERD as well as eosinophilic esophagitis.  GEJ appears open and even somewhat patulous.  Diffuse erythematous gastropathy as well as mild bulbar duodenitis noted.  IMP:  -  Esophageal food bolus obstruction: Site of obstruction likely at GEJ.  Definite pathology as cause not discerned–not typical Schatzki ring but edema and eroded appearance obscures endoscopic assessment.  Possibly GERD mediated although further evaluation for eosinophilic esophagitis planned.  -  Linear tear GEJ: Possible Johanne-Cooper tear attributed to repetitive retching versus erosive impact of prolonged bolus   retention.  -  Erythematous gastropathy and  bulbar duodenitis.  REC:  -  Findings discussed with patient.  Cautious eating habits.  Eat slowly, chew thoroughly, small bites.  -  Antireflux precautions.  -  Pantoprazole 40 mg p.o. twice daily.  -  Avoid NSAIDs.  -  Await pathology to assess for GERD mediated changes or possible eosinophilic esophagitis.  -  Outpatient GI follow-up  With consideration of follow-up EGD in 6 to 8 weeks.

## 2024-10-06 NOTE — ED ADULT TRIAGE NOTE - CHIEF COMPLAINT QUOTE
Pt st " I had a bbq and  ate steak at 7pm and feel like it is stuck...cant drink anything without throwing it up. "

## 2024-10-06 NOTE — H&P ADULT - NSHPPHYSICALEXAM_GEN_ALL_CORE
VITAL SIGNS:  T(C): 37.2 (10-06-24 @ 09:50), Max: 37.2 (10-06-24 @ 09:50)  T(F): 98.9 (10-06-24 @ 09:50), Max: 98.9 (10-06-24 @ 09:50)  HR: 69 (10-06-24 @ 09:50) (66 - 87)  BP: 130/94 (10-06-24 @ 09:50) (130/94 - 159/97)  BP(mean): --  RR: 17 (10-06-24 @ 09:50) (14 - 18)  SpO2: 100% (10-06-24 @ 09:50) (100% - 100%)  Wt(kg): --    PHYSICAL EXAM:  Constitutional: NAD  Head: NC/AT  Eyes: PERRL, EOMI, anicteric sclera  ENT: no nasal discharge; MMM  Neck: supple; no JVD  Respiratory: CTA B/L; no W/R/R  Cardiac: +S1/S2; RRR; no M/R/G  Gastrointestinal: soft, NT/ND; no rebound or guarding; +BSx4  Extremities: WWP, no clubbing or cyanosis; no peripheral edema  Musculoskeletal: NROM x4; no joint swelling, tenderness or erythema  Vascular: 2+ radial, DP/PT pulses B/L  Neurologic: AAOx3; CNII-XII grossly intact; no focal deficits

## 2024-10-06 NOTE — DISCHARGE NOTE PROVIDER - CARE PROVIDER_API CALL
Dr. Whiting (PCP),   Phone: (   )    -  Fax: (   )    -  Established Patient  Follow Up Time: 1 week

## 2024-10-06 NOTE — H&P ADULT - NSICDXPASTMEDICALHX_GEN_ALL_CORE_FT
PAST MEDICAL HISTORY:  GERD (gastroesophageal reflux disease)     No pertinent past medical history     No pertinent past medical history     No pertinent past medical history

## 2024-10-06 NOTE — H&P ADULT - ASSESSMENT
43 yo male with GERD presents with complaints of globus sensation after eating a steak. admitted for EGD.

## 2024-10-06 NOTE — DISCHARGE NOTE PROVIDER - NSFOLLOWUPCLINICS_GEN_ALL_ED_FT
[facility]  Gastroenterology  [address] [place] [id number]  Phone: [phone]  Fax:   Follow Up Time: 1 month

## 2024-10-06 NOTE — DISCHARGE NOTE PROVIDER - PROVIDER TOKENS
FREE:[LAST:[Dr. Whiting (PCP)],PHONE:[(   )    -],FAX:[(   )    -],FOLLOWUP:[1 week],ESTABLISHEDPATIENT:[T]]

## 2024-10-06 NOTE — ED PROVIDER NOTE - PROGRESS NOTE DETAILS
Pt reassessed after glucagon. Pt states the globus has lessened but still feels like something is stuck. Pt made himself vomit once in the emergency department. Still pending CT read.  Jacqui Medina PGY1 Adrianne Anderson PGY3 - sign out -44-year-old male without significant past medical history presenting with globus sensation and his esophagus.  Trialed drinking orange juice and ginger ale and induce vomiting and patient.  GI contacted and patient scheduled for OR endoscopy at 2 PM this afternoon.  Admit to medicine for endoscopy.

## 2024-10-06 NOTE — ED ADULT NURSE REASSESSMENT NOTE - NS ED NURSE REASSESS COMMENT FT1
received patient from previous RN. a&ox4, ambulatory. patient is resting comfortably, respirations are even and nonlabored on room air. no requests at this time. offering no further medical complaints, airway patent, speaking in full complete sentences. comfort and safety maintained. pending CT results and dispo.
report given to ESS[facility] 1 RN. pt in no acute distress at this time. comfort and safety maintained.

## 2024-10-06 NOTE — ED PROVIDER NOTE - PHYSICAL EXAMINATION
GENERAL: no acute distress, non-toxic appearing  HEENT: normal conjunctiva, oral mucosa moist, no lesion or irritation of the posterior oropharynx, no ttp or crepitus of neck and submandibular area.   PULM: clear to ascultation bilaterally, no appreciable crackles, rales, rhonchi, or wheezing  GI: abdomen nondistended, soft, nontender

## 2024-10-06 NOTE — CONSULT NOTE ADULT - ASSESSMENT
44 year old male, no significant PMH, presenting with food impaction.     Recommendations:   -will plan for endoscopic foreign body removal today  -please keep patient NPO    Note incomplete until finalized by attending signature/attestation.    Genet Pierre  GI/Hepatology Fellow    MONDAY-FRIDAY 8AM-5PM:  Pager# [phone] ([facility]) or [phone] ([facility])    NON-URGENT CONSULTS:  Please email [email or web address]  AT NIGHT AND ON WEEKENDS:  Contact on-call GI fellow from 5pm-8am and on weekends/holidays   44 year old male, PMH Gerd, presenting with food impaction.     Recommendations:   -will plan for endoscopic foreign body removal today  -please keep patient NPO    Note incomplete until finalized by attending signature/attestation.    Genet Pierre  GI/Hepatology Fellow    MONDAY-FRIDAY 8AM-5PM:  Pager# [phone] ([facility]) or [phone] ([facility])    NON-URGENT CONSULTS:  Please email [email or web address] OR [email or web address]  AT NIGHT AND ON WEEKENDS:  Contact on-call GI fellow from 5pm-8am and on weekends/holidays

## 2024-10-06 NOTE — ED PROVIDER NOTE - OBJECTIVE STATEMENT
44-year-old male with no significant past medical history presenting today for concern of foreign body.  Patient was eating boneless steak around 7 PM when he fell like a piece of a his throat.  Patient has been trying to cough/vomit out to meat.  Patient has had multiple episodes of vomiting since that is nonbloody. Pt has not ate anything since this occurred. Patient endorses increased oral secretions. Patient also endorsing burning chest pain, patient states in the past he was from acid reflux.  Patient denies difficulty breathing, abdominal pain, pooling secretions. neck pain.

## 2024-10-06 NOTE — H&P ADULT - TIME BILLING
Time-based billing (NON-critical care).     [ 75 ] minutes spent on total encounter; more than 50% of the visit was spent counseling and / or coordinating care by the attending physician.  The necessity of the time spent during the encounter on this date of service was due to:     documentation in [place], reviewing chart and coordinating care with patient/ACP and interdisciplinary staff (such as , social workers, etc) as well as reviewing vitals, laboratory data, radiology, medication list, consultants' recommendations and prior records. Interventions were performed as documented above.

## 2024-10-06 NOTE — ED PROVIDER NOTE - ATTENDING CONTRIBUTION TO CARE
DR. HERRERA, ATTENDING MD-  I performed a face to face bedside interview with the patient regarding history of present illness, review of symptoms and past medical history. I completed an independent physical exam.  I have discussed the patient's plan of care with the resident.   Documentation as above in the note.    43 y/o male with fb sensation in throat.  Eating steak at 7pm and feels steak is lodged in his throat.  Had a few episode of self induced n/v to try to dislodge food bolus.  Continues to have sensation in throat.  No difficulty breathing or speaking.  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  Obtain cbc cmp coags t&s ct neck give glucagon antiemetic may need admission for GI intervention.

## 2024-10-06 NOTE — ED PROVIDER NOTE - NSFOLLOWUPINSTRUCTIONS_ED_ALL_ED_FT
Thank you for coming to the Emergency Department.    You were seen today for foreign body in your throat. We obtained lab work and imaging to help determine what was causing your symptoms. Your CT scan demonstrated *****    Based on our examination we have determined that there is no immediate danger to your health at this time.    We would like you to follow up with your primary care doctor within 1 week.     PLEASE RETURN TO THE EMERGENCY DEPARTMENT and call 911 IF YOU EXPERIENCE ANY OF THE FOLLOWING SYMPTOMS: pooling of saliva/secretions, difficulty breathing, persistent vomiting.

## 2024-10-06 NOTE — H&P ADULT - NSHPLABSRESULTS_GEN_ALL_CORE
LABS:                        15.5   7.12  )-----------( 306      ( 06 Oct 2024 03:45 )             45.2     10-06    142  |  103  |  17  ----------------------------<  109[H]  4.1   |  25  |  0.85    Ca    9.6      06 Oct 2024 03:45  Phos  4.5     10-06  Mg     2.20     10-06    TPro  7.5  /  Alb  4.9  /  TBili  1.0  /  DBili  x   /  AST  29  /  ALT  48[H]  /  AlkPhos  104  10-06    PT/INR - ( 06 Oct 2024 03:45 )   PT: 11.7 sec;   INR: 1.01 ratio         PTT - ( 06 Oct 2024 03:45 )  PTT:32.9 sec  Urinalysis Basic - ( 06 Oct 2024 03:45 )    Color: x / Appearance: x / SG: x / pH: x  Gluc: 109 mg/dL / Ketone: x  / Bili: x / Urobili: x   Blood: x / Protein: x / Nitrite: x   Leuk Esterase: x / RBC: x / WBC x   Sq Epi: x / Non Sq Epi: x / Bacteria: x      CAPILLARY BLOOD GLUCOSE          RADIOLOGY & ADDITIONAL TESTS: Reviewed.    CT Neck:    IMPRESSION:  No dominant cervical mass or significant lymphadenopathy.    No gross evidence of radiopaque foreign body on this contrast-enhanced   study. No deep soft tissue emphysema is seen.

## 2024-10-06 NOTE — ED ADULT NURSE NOTE - OBJECTIVE STATEMENT
Pt arrives to ED room 6 c/o feeling like something is stuck in throat /esophagus after eating barbecue today.  Pt feels like if he eats or drinks it gets stuck and he can't keep it down.  20g iv placed to RT AC, labs drawn and sent.  Pt medicated as per EMAR.  Pt awaiting CT.

## 2024-10-06 NOTE — DISCHARGE NOTE PROVIDER - NSDCCPCAREPLAN_GEN_ALL_CORE_FT
PRINCIPAL DISCHARGE DIAGNOSIS  Diagnosis: Globus pharyngeus  Assessment and Plan of Treatment: You were admitted for "food stuck in the throat."  You had an endoscopy done where the foreign object was removed. However, you also were found to have a GERD (acid reflux) with questionable tear in GI tract. You are prescribed pantoprazole twice per day. Take as instructed.  -You should follow with Gastronenterology clinic as instructed.  Follow up with your PCP within 1 week.

## 2024-10-17 ENCOUNTER — APPOINTMENT (OUTPATIENT)
Dept: GASTROENTEROLOGY | Facility: CLINIC | Age: 44
End: 2024-10-17
Payer: COMMERCIAL

## 2024-10-17 ENCOUNTER — TRANSCRIPTION ENCOUNTER (OUTPATIENT)
Age: 44
End: 2024-10-17

## 2024-10-17 VITALS
WEIGHT: 180 LBS | OXYGEN SATURATION: 98 % | DIASTOLIC BLOOD PRESSURE: 80 MMHG | HEIGHT: 68 IN | TEMPERATURE: 97.9 F | BODY MASS INDEX: 27.28 KG/M2 | HEART RATE: 75 BPM | SYSTOLIC BLOOD PRESSURE: 156 MMHG

## 2024-10-17 DIAGNOSIS — K29.70 GASTRITIS, UNSPECIFIED, W/OUT BLEEDING: ICD-10-CM
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
## 2024-10-17 DIAGNOSIS — Z78.9 OTHER SPECIFIED HEALTH STATUS: ICD-10-CM

## 2024-10-17 DIAGNOSIS — B96.81 GASTRITIS, UNSPECIFIED, W/OUT BLEEDING: ICD-10-CM

## 2024-10-17 DIAGNOSIS — T18.128A FOOD IN ESOPHAGUS CAUSING OTHER INJURY, INITIAL ENCOUNTER: ICD-10-CM

## 2024-10-17 DIAGNOSIS — K20.0 EOSINOPHILIC ESOPHAGITIS: ICD-10-CM

## 2024-10-17 PROCEDURE — 99215 OFFICE O/P EST HI 40 MIN: CPT

## 2024-10-17 RX ORDER — AMOXICILLIN 500 MG/1
500 TABLET, FILM COATED ORAL
Qty: 56 | Refills: 0 | Status: ACTIVE | COMMUNITY
Start: 2024-10-17 | End: 1900-01-01

## 2024-10-17 RX ORDER — OMEPRAZOLE 20 MG/1
20 CAPSULE, DELAYED RELEASE ORAL TWICE DAILY
Qty: 28 | Refills: 2 | Status: ACTIVE | COMMUNITY
Start: 2024-10-17 | End: 1900-01-01

## 2024-10-17 RX ORDER — PANTOPRAZOLE 40 MG/1
40 TABLET, DELAYED RELEASE ORAL TWICE DAILY
Qty: 30 | Refills: 5 | Status: ACTIVE | COMMUNITY
Start: 2024-10-17 | End: 1900-01-01

## 2024-10-17 RX ORDER — CLARITHROMYCIN 500 MG/1
500 TABLET, FILM COATED ORAL
Qty: 28 | Refills: 0 | Status: ACTIVE | COMMUNITY
Start: 2024-10-17 | End: 1900-01-01

## 2025-01-08 ENCOUNTER — NON-APPOINTMENT (OUTPATIENT)
Age: 45
End: 2025-01-08

## 2025-01-17 ENCOUNTER — LABORATORY RESULT (OUTPATIENT)
Age: 45
End: 2025-01-17

## 2025-01-17 ENCOUNTER — APPOINTMENT (OUTPATIENT)
Dept: GASTROENTEROLOGY | Facility: CLINIC | Age: 45
End: 2025-01-17
Payer: COMMERCIAL

## 2025-01-17 PROCEDURE — 43239 EGD BIOPSY SINGLE/MULTIPLE: CPT

## 2025-01-17 RX ORDER — OMEPRAZOLE 40 MG/1
40 CAPSULE, DELAYED RELEASE ORAL
Qty: 30 | Refills: 5 | Status: ACTIVE | COMMUNITY
Start: 2025-01-17 | End: 1900-01-01

## 2025-06-16 ENCOUNTER — TRANSCRIPTION ENCOUNTER (OUTPATIENT)
Age: 45
End: 2025-06-16

## 2025-06-25 ENCOUNTER — APPOINTMENT (OUTPATIENT)
Dept: GASTROENTEROLOGY | Facility: CLINIC | Age: 45
End: 2025-06-25

## 2025-07-07 ENCOUNTER — NON-APPOINTMENT (OUTPATIENT)
Age: 45
End: 2025-07-07

## 2025-07-15 ENCOUNTER — APPOINTMENT (OUTPATIENT)
Dept: GASTROENTEROLOGY | Facility: CLINIC | Age: 45
End: 2025-07-15
Payer: COMMERCIAL

## 2025-07-15 VITALS
DIASTOLIC BLOOD PRESSURE: 87 MMHG | OXYGEN SATURATION: 99 % | SYSTOLIC BLOOD PRESSURE: 150 MMHG | HEART RATE: 66 BPM | WEIGHT: 178 LBS | HEIGHT: 68 IN | BODY MASS INDEX: 26.98 KG/M2

## 2025-07-15 PROBLEM — R14.0 ABDOMINAL BLOATING: Status: ACTIVE | Noted: 2025-07-15

## 2025-07-15 PROCEDURE — 99214 OFFICE O/P EST MOD 30 MIN: CPT

## 2025-08-28 ENCOUNTER — TRANSCRIPTION ENCOUNTER (OUTPATIENT)
Age: 45
End: 2025-08-28

## (undated) DEVICE — GOWN LG

## (undated) DEVICE — UNDERPAD LINEN SAVER 17 X 24"

## (undated) DEVICE — LABELS BLANK W PEN

## (undated) DEVICE — CATH IV SAFE BC 22G X 1" (BLUE)

## (undated) DEVICE — DRSG CURITY GAUZE SPONGE 4 X 4" 12-PLY NON-STERILE

## (undated) DEVICE — DENTURE CUP PINK

## (undated) DEVICE — DRSG 2X2

## (undated) DEVICE — VENODYNE/SCD SLEEVE CALF MEDIUM

## (undated) DEVICE — CONTAINER FORMALIN 10% 20ML

## (undated) DEVICE — SOL INJ NS 0.9% 500ML 1-PORT

## (undated) DEVICE — TUBING IV SET GRAVITY 1Y 78" MACRO

## (undated) DEVICE — ANESTHESIA CIRCUIT ADULT HMEF

## (undated) DEVICE — NDL HYPO SAFE 22G X 1" (BLACK)

## (undated) DEVICE — PACK IV START WITH CHG

## (undated) DEVICE — BITE BLOCK ADULT 20 X 27MM (GREEN)

## (undated) DEVICE — TUBING MEDI-VAC W MAXIGRIP CONNECTORS 1/4"X6'

## (undated) DEVICE — BIOPSY FORCEP COLD DISP

## (undated) DEVICE — SOL IRR POUR NS 0.9% 500ML

## (undated) DEVICE — SALIVA EJECTOR (BLUE)

## (undated) DEVICE — WARMING BLANKET FULL ADULT

## (undated) DEVICE — SYR LUER LOK 3CC